# Patient Record
Sex: MALE | Race: ASIAN | NOT HISPANIC OR LATINO | URBAN - METROPOLITAN AREA
[De-identification: names, ages, dates, MRNs, and addresses within clinical notes are randomized per-mention and may not be internally consistent; named-entity substitution may affect disease eponyms.]

---

## 2018-05-14 ENCOUNTER — EMERGENCY (EMERGENCY)
Facility: HOSPITAL | Age: 53
LOS: 0 days | Discharge: HOME | End: 2018-05-14
Attending: EMERGENCY MEDICINE | Admitting: EMERGENCY MEDICINE

## 2018-05-14 VITALS
HEART RATE: 107 BPM | SYSTOLIC BLOOD PRESSURE: 149 MMHG | RESPIRATION RATE: 18 BRPM | DIASTOLIC BLOOD PRESSURE: 91 MMHG | OXYGEN SATURATION: 97 % | TEMPERATURE: 98 F

## 2018-05-14 DIAGNOSIS — E78.5 HYPERLIPIDEMIA, UNSPECIFIED: ICD-10-CM

## 2018-05-14 DIAGNOSIS — M79.1 MYALGIA: ICD-10-CM

## 2018-05-14 DIAGNOSIS — B02.9 ZOSTER WITHOUT COMPLICATIONS: ICD-10-CM

## 2018-05-14 DIAGNOSIS — R21 RASH AND OTHER NONSPECIFIC SKIN ERUPTION: ICD-10-CM

## 2018-05-14 DIAGNOSIS — I10 ESSENTIAL (PRIMARY) HYPERTENSION: ICD-10-CM

## 2018-05-14 DIAGNOSIS — R68.83 CHILLS (WITHOUT FEVER): ICD-10-CM

## 2018-05-14 DIAGNOSIS — I25.10 ATHEROSCLEROTIC HEART DISEASE OF NATIVE CORONARY ARTERY WITHOUT ANGINA PECTORIS: ICD-10-CM

## 2018-05-14 RX ORDER — ACYCLOVIR SODIUM 500 MG
800 VIAL (EA) INTRAVENOUS ONCE
Qty: 0 | Refills: 0 | Status: COMPLETED | OUTPATIENT
Start: 2018-05-14 | End: 2018-05-14

## 2018-05-14 RX ORDER — VALACYCLOVIR 500 MG/1
1 TABLET, FILM COATED ORAL
Qty: 21 | Refills: 0 | OUTPATIENT
Start: 2018-05-14 | End: 2018-05-20

## 2018-05-14 RX ADMIN — Medication 800 MILLIGRAM(S): at 23:48

## 2018-05-14 NOTE — ED PROVIDER NOTE - PHYSICAL EXAMINATION
Alert, NAD, WDWN, well-appearing  PERRL, EOMI, normal pupils, no icterus, normal external ENT, pink/moist membranes  Airway intact, Lungs CTAB, no wheezing or rhonchi, normal resp effort w/o tachypnea, no retractions, speaking full sentences  CVS1S2, RRR, no m/g/r, 2+ pulses b/l, warm/well-perfused  Abdomen soft, nondistended, no tenderness on palpation to all 4 quadrants  Skin warm/dry, erythematous tender vesicular rash to the right neck and shoulder, does not cross the midline

## 2018-05-14 NOTE — ED PROVIDER NOTE - NS ED ROS FT
SPECIFIC PATIENT INSTRUCTIONS FROM THE PHYSICIAN WHO TREATED YOU IN THE ER TODAY:  1. Return if any concerns or worsening of condition(s)  2.  Nasacort AQ and claritin for the next week and then stop. If sinus congestion recurs, then restart the nasacort AQ and claritin for a week at a time. 3.  FOLLOW UP APPOINTMENT:  Your primary doctor in 1 week. 4.  Use a Nedi Pot to help clear your sinuses. You may purchase the original Nedi Pot at a pharmacy or get a generic version at [facility] or [employer]. Sinusitis: Care Instructions  Your Care Instructions    Sinusitis is an infection of the lining of the sinus cavities in your head. Sinusitis often follows a cold. It causes pain and pressure in your head and face. In most cases, sinusitis gets better on its own in 1 to 2 weeks. But some mild symptoms may last for several weeks. Sometimes antibiotics are needed. Follow-up care is a key part of your treatment and safety. Be sure to make and go to all appointments, and call your doctor if you are having problems. It's also a good idea to know your test results and keep a list of the medicines you take. How can you care for yourself at home? · Take an over-the-counter pain medicine, such as acetaminophen (Tylenol), ibuprofen (Advil, Motrin), or naproxen (Aleve). Read and follow all instructions on the label. · If the doctor prescribed antibiotics, take them as directed. Do not stop taking them just because you feel better. You need to take the full course of antibiotics. · Be careful when taking over-the-counter cold or flu medicines and Tylenol at the same time. Many of these medicines have acetaminophen, which is Tylenol. Read the labels to make sure that you are not taking more than the recommended dose. Too much acetaminophen (Tylenol) can be harmful. · Breathe warm, moist air from a steamy shower, a hot bath, or a sink filled with hot water. Avoid cold, dry air.  Using a humidifier in your home may help. Follow the directions for cleaning the machine. · Use saline (saltwater) nasal washes to help keep your nasal passages open and wash out mucus and bacteria. You can buy saline nose drops at a grocery store or drugstore. Or you can make your own at home by adding 1 teaspoon of salt and 1 teaspoon of baking soda to 2 cups of distilled water. If you make your own, fill a bulb syringe with the solution, insert the tip into your nostril, and squeeze gently. [place] your nose. · Put a hot, wet towel or a warm gel pack on your face 3 or 4 times a day for 5 to 10 minutes each time. · Try a decongestant nasal spray like oxymetazoline (Afrin). Do not use it for more than 3 days in a row. Using it for more than 3 days can make your congestion worse. When should you call for help? Call your doctor now or seek immediate medical care if:  · You have new or worse swelling or redness in your face or around your eyes. · You have a new or higher fever. Watch closely for changes in your health, and be sure to contact your doctor if:  · You have new or worse facial pain. · The mucus from your nose becomes thicker (like pus) or has new blood in it. · You are not getting better as expected. Where can you learn more? Go to http://mar-david.info/. Enter O588 in the search box to learn more about \"Sinusitis: Care Instructions. \"  Current as of: July 29, 2016  Content Version: 11.2  © 3304-6952 [employer]. Care instructions adapted under license by [employer] (which disclaims liability or warranty for this information). If you have questions about a medical condition or this instruction, always ask your healthcare professional. [place] [address] any warranty or liability for your use of this information.          Saline Nasal Washes: Care Instructions  Your Care Instructions  Saline nasal washes help keep the nasal passages open by washing out thick or dried mucus. This simple remedy can help relieve symptoms of allergies, sinusitis, and colds. It also can make the nose feel more comfortable by keeping the mucous membranes moist. You may notice a little burning sensation in your nose the first few times you use the solution, but this usually gets better in a few days. Follow-up care is a key part of your treatment and safety. Be sure to make and go to all appointments, and call your doctor if you are having problems. It's also a good idea to know your test results and keep a list of the medicines you take. How can you care for yourself at home? · You can buy premixed saline solution in a squeeze bottle or other sinus rinse products at a drugstore. Read and follow the instructions on the label. · You also can make your own saline solution by adding 1 teaspoon of salt and 1 teaspoon of baking soda to 2 cups of distilled water. · If you use a homemade solution, pour a small amount into a clean bowl. Using a rubber bulb syringe, squeeze the syringe and place the tip in the salt water. Pull a small amount of the salt water into the syringe by relaxing your hand. · Sit down with your head tilted slightly back. Do not lie down. Put the tip of the bulb syringe or the squeeze bottle a little way into one of your nostrils. Gently drip or squirt a few drops into the nostril. Repeat with the other nostril. Some sneezing and gagging are normal at first.  · Gently blow your nose. · Wipe the syringe or bottle tip clean after each use. · Repeat this 2 or 3 times a day. · Use nasal washes gently if you have nosebleeds often. When should you call for help? Watch closely for changes in your health, and be sure to contact your doctor if:  · You often get nosebleeds. · You have problems doing the nasal washes. Where can you learn more? Go to http://mar-david.info/.   Enter [id number] in the search box to learn more about \"Saline Nasal Washes: Care Instructions. \"  Current as of: July 29, 2016  Content Version: 11.2  © 0889-2382 [employer]. Care instructions adapted under license by [employer] (which disclaims liability or warranty for this information). If you have questions about a medical condition or this instruction, always ask your healthcare professional. [facility] any warranty or liability for your use of this information. Chest Pain: Care Instructions  Your Care Instructions  There are many things that can cause chest pain. Some are not serious and will get better on their own in a few days. But some kinds of chest pain need more testing and treatment. Your doctor may have recommended a follow-up visit in the next 8 to 12 hours. If you are not getting better, you may need more tests or treatment. Even though your doctor has released you, you still need to watch for any problems. The doctor carefully checked you, but sometimes problems can develop later. If you have new symptoms or if your symptoms do not get better, get medical care right away. If you have worse or different chest pain or pressure that lasts more than 5 minutes or you passed out (lost consciousness), call 911 or seek other emergency help right away. A medical visit is only one step in your treatment. Even if you feel better, you still need to do what your doctor recommends, such as going to all suggested follow-up appointments and taking medicines exactly as directed. This will help you recover and help prevent future problems. How can you care for yourself at home? · Rest until you feel better. · Take your medicine exactly as prescribed. Call your doctor if you think you are having a problem with your medicine. · Do not drive after taking a prescription pain medicine. When should you call for help? Call 911 if:  · You passed out (lost consciousness). · You have severe difficulty breathing.   · You have symptoms of a heart attack. These may include:  ¨ Chest pain or pressure, or a strange feeling in your chest.  ¨ Sweating. ¨ Shortness of breath. ¨ Nausea or vomiting. ¨ Pain, pressure, or a strange feeling in your back, neck, jaw, or upper belly or in one or both shoulders or arms. ¨ Lightheadedness or sudden weakness. ¨ A fast or irregular heartbeat. After you call 911, the  may tell you to chew 1 adult-strength or 2 to 4 low-dose aspirin. Wait for an ambulance. Do not try to drive yourself. Call your doctor today if:  · You have any trouble breathing. · Your chest pain gets worse. · You are dizzy or lightheaded, or you feel like you may faint. · You are not getting better as expected. · You are having new or different chest pain. Where can you learn more? Go to http://mar-david.info/. Enter A120 in the search box to learn more about \"Chest Pain: Care Instructions. \"  Current as of: May 27, 2016  Content Version: 11.2  © 0875-8213 [employer]. Care instructions adapted under license by [employer] (which disclaims liability or warranty for this information). If you have questions about a medical condition or this instruction, always ask your healthcare professional. [address] any warranty or liability for your use of this information. [employer] Activation    Thank you for requesting access to [employer]. Please follow the instructions below to securely access and download your online medical record. [employer] allows you to send messages to your doctor, view your test results, renew your prescriptions, schedule appointments, and more. How Do I Sign Up? 1. In your internet browser, go to [email or web address]. 2. Click on the First Time User? Click Here link in the Sign In box. You will see the New Member Sign Up page. 3. Enter your [employer] Access Code exactly as it appears below.  You will not need to use this code after youve completed the sign-up process. If you do not sign up before the expiration date, you must request a new code. Ways[employer] Access Code: [email or web address]  Expires: 2017  8:45 PM (This is the date your [employer] access code will )    4. Enter the last four digits of your Social Security Number (xxxx) and Date of Birth (mm/dd/yyyy) as indicated and click Submit. You will be taken to the next sign-up page. 5. Create a Chooslyt ID. This will be your Ways[employer] login ID and cannot be changed, so think of one that is secure and easy to remember. 6. Create a Ways[employer] password. You can change your password at any time. 7. Enter your Password Reset Question and Answer. This can be used at a later time if you forget your password. 8. Enter your e-mail address. You will receive e-mail notification when new information is available in [address]. 9. Click Sign Up. You can now view and download portions of your medical record. 10. Click the Download Summary menu link to download a portable copy of your medical information. Additional Information    If you have questions, please visit the Frequently Asked Questions section of the WaysGo website at [email or web address] Remember, WaysGo is NOT to be used for urgent needs. For medical emergencies, dial 911. Review of Systems:  	•	CONSTITUTIONAL - no fever, no diaphoresis, no chills  	•	SKIN - +rash  	•	RESPIRATORY - no shortness of breath, no cough  	•	CARDIAC - no chest pain, no palpitations  	•	GI - no abd pain, no nausea, no vomiting, no diarrhea  	•	MUSCULOSKELETAL - no joint paint, no swelling, no redness  	•	NEUROLOGIC - no weakness, no headache, no paresthesias, no LOC

## 2018-05-14 NOTE — ED PROVIDER NOTE - ATTENDING CONTRIBUTION TO CARE
I personally evaluated the patient. I reviewed the Resident’s or Physician Assistant’s note (as assigned above), and agree with the findings and plan except as documented in my note.     52 male here for eval of new rash to right neck area. On Humira from his PMD for plaque psoriasis, dose #7 given today.     PMH: HLD, CAD, HTN  PSH: CABG x 2    ROS: + chills & myalgias. No other symptoms.     PE: male in no distress. SKIN: right neck area with vesicular rash no signs of cellulitis CHEST: CTA bilateral.CV: pulses intact    Impression: zoster    Plan: antivirals, supportive care, return & Follow up instructions

## 2018-05-14 NOTE — ED PROVIDER NOTE - MEDICAL DECISION MAKING DETAILS
52 male with new zoster on DMARD prescription for plaque psoriasis will give antivirals and continue outpatient management.

## 2018-05-14 NOTE — ED ADULT TRIAGE NOTE - CHIEF COMPLAINT QUOTE
pt, hx psoriasis, takes Humira injection and half hour later began having hives to right neck and chest x3 days. A&Ox3, no respiratory distress. Also c/o body aches and fever.

## 2018-05-14 NOTE — ED PROVIDER NOTE - OBJECTIVE STATEMENT
52 yr old male, PMH of HTN, HLD, CAD x 2 stents, and plaque psoriasis, presenting with rash to the right neck and shoulder, states he recently started Humira, has had 6 shots without problems, today took his 7th shot and then 30 mins later began to have this rash erupt, associated with some subjective chills and myalgias. Rash is painful, described as burning sensation. States he has had the shingles vaccine.

## 2022-07-11 VITALS
BODY MASS INDEX: 28.39 KG/M2 | HEART RATE: 70 BPM | HEIGHT: 67.72 IN | DIASTOLIC BLOOD PRESSURE: 90 MMHG | SYSTOLIC BLOOD PRESSURE: 150 MMHG | WEIGHT: 185.19 LBS

## 2022-10-25 DIAGNOSIS — Z82.49 FAMILY HISTORY OF ISCHEMIC HEART DISEASE AND OTHER DISEASES OF THE CIRCULATORY SYSTEM: ICD-10-CM

## 2022-11-01 RX ORDER — ASPIRIN 81 MG
81 TABLET, DELAYED RELEASE (ENTERIC COATED) ORAL
Refills: 0 | Status: DISCONTINUED | COMMUNITY
End: 2022-11-01

## 2022-11-15 ENCOUNTER — APPOINTMENT (OUTPATIENT)
Dept: CARDIOLOGY | Facility: CLINIC | Age: 57
End: 2022-11-15

## 2022-11-15 VITALS — BODY MASS INDEX: 26.66 KG/M2 | WEIGHT: 180 LBS | HEIGHT: 69 IN

## 2022-11-15 PROCEDURE — 99204 OFFICE O/P NEW MOD 45 MIN: CPT

## 2022-11-15 RX ORDER — CHLORTHALIDONE 25 MG/1
25 TABLET ORAL
Qty: 30 | Refills: 0 | Status: DISCONTINUED | COMMUNITY
Start: 2022-07-11 | End: 2022-11-15

## 2022-11-15 NOTE — DISCUSSION/SUMMARY
[FreeTextEntry1] : add zetia and start jardiance on patient \par LDL 84 target of 50 \par TG: diet control \par cut down salt \par exercise 5 times/week \par f/u in 4 months \par get bloodwork \par repeat bp 130/80 \par check at home if high call for adjusting meds.

## 2022-11-15 NOTE — HISTORY OF PRESENT ILLNESS
[FreeTextEntry1] : pt with HLD, DM, FAMILY H/ MI(BROTHER AT 55 years of age), Mid LAD ESHA  exsmoker  \par \par pt exercising 3 to 4 times/week on treadmill without cp or sob, pt says bp high 150 today but usually fine however ate a lot of salt with chinese the last few days. \par T poor diet \par LDL 84 \par A1c: 6.3 on diet control for DM. pt with no cp or sob. \par \par bp today 150/80 initally

## 2022-11-15 NOTE — PHYSICAL EXAM
[Normal S1, S2] : normal S1, S2 [Murmur] : murmur [Clear Lung Fields] : clear lung fields [de-identified] : JOSE  [de-identified] : RRR

## 2022-12-16 ENCOUNTER — NON-APPOINTMENT (OUTPATIENT)
Age: 57
End: 2022-12-16

## 2022-12-16 DIAGNOSIS — Z78.9 OTHER SPECIFIED HEALTH STATUS: ICD-10-CM

## 2022-12-16 DIAGNOSIS — Z87.891 PERSONAL HISTORY OF NICOTINE DEPENDENCE: ICD-10-CM

## 2023-05-16 NOTE — DISCUSSION/SUMMARY
[FreeTextEntry1] : add zetia and start jardiance on patient \par LDL 84 target of 50 \par TG: diet control \par cut down salt \par exercise 5 times/week \par f/u in 4 months \par get blood work \par repeat bp 130/80 \par check at home if high call for adjusting meds.  [facility] Pregnancy And Lactation Text: This medication is Pregnancy Category X and contraindicated in pregnancy and in women who may become pregnant. It is unknown if this medication is excreted in breast milk.

## 2023-05-16 NOTE — PHYSICAL EXAM
[Normal S1, S2] : normal S1, S2 [Murmur] : murmur [Clear Lung Fields] : clear lung fields [de-identified] : JOSE  [de-identified] : RRR

## 2023-05-16 NOTE — HISTORY OF PRESENT ILLNESS
[FreeTextEntry1] : pt with HLD, DM, FAMILY H/ MI(BROTHER AT 55 years of age), Mid LAD ESHA  exsmoker  \par \par pt exercising 3 to 4 times/week on treadmill without cp or sob, pt says bp high 150 today but usually fine however ate a lot of salt with chinese the last few days. \par T poor diet \par LDL 84 \par A1c: 6.3 on diet control for DM. pt with no cp or sob. \par \par bp today 150/80 initally \par \par 23:\par

## 2023-05-17 ENCOUNTER — APPOINTMENT (OUTPATIENT)
Dept: CARDIOLOGY | Facility: CLINIC | Age: 58
End: 2023-05-17

## 2023-07-05 RX ORDER — LOSARTAN POTASSIUM 100 MG/1
100 TABLET, FILM COATED ORAL
Refills: 0 | Status: DISCONTINUED | COMMUNITY
End: 2023-07-05

## 2023-07-05 RX ORDER — EMPAGLIFLOZIN 10 MG/1
10 TABLET, FILM COATED ORAL DAILY
Qty: 90 | Refills: 3 | Status: ACTIVE | COMMUNITY
Start: 2022-11-15 | End: 1900-01-01

## 2023-07-05 RX ORDER — EZETIMIBE 10 MG/1
10 TABLET ORAL
Qty: 90 | Refills: 3 | Status: ACTIVE | COMMUNITY
Start: 2022-11-15 | End: 1900-01-01

## 2023-07-05 RX ORDER — BENZONATATE 100 MG/1
100 CAPSULE ORAL
Qty: 30 | Refills: 0 | Status: DISCONTINUED | COMMUNITY
Start: 2022-07-13 | End: 2023-07-05

## 2023-11-14 RX ORDER — ASPIRIN 81 MG/1
81 TABLET, CHEWABLE ORAL DAILY
Qty: 90 | Refills: 3 | Status: ACTIVE | COMMUNITY
Start: 2022-03-10 | End: 1900-01-01

## 2023-11-14 RX ORDER — LOSARTAN POTASSIUM 100 MG/1
100 TABLET, FILM COATED ORAL DAILY
Qty: 90 | Refills: 3 | Status: ACTIVE | COMMUNITY
Start: 1900-01-01 | End: 1900-01-01

## 2023-11-14 RX ORDER — METOPROLOL TARTRATE 25 MG/1
25 TABLET, FILM COATED ORAL TWICE DAILY
Qty: 90 | Refills: 3 | Status: ACTIVE | COMMUNITY
Start: 1900-01-01 | End: 1900-01-01

## 2023-11-14 RX ORDER — ATORVASTATIN CALCIUM 80 MG/1
80 TABLET, FILM COATED ORAL
Qty: 90 | Refills: 3 | Status: ACTIVE | COMMUNITY
Start: 1900-01-01 | End: 1900-01-01

## 2023-12-11 ENCOUNTER — APPOINTMENT (OUTPATIENT)
Dept: CARDIOLOGY | Facility: CLINIC | Age: 58
End: 2023-12-11
Payer: MEDICAID

## 2023-12-11 RX ORDER — CLOPIDOGREL BISULFATE 75 MG/1
75 TABLET, FILM COATED ORAL
Qty: 90 | Refills: 3 | Status: DISCONTINUED | COMMUNITY
End: 2023-12-11

## 2023-12-18 ENCOUNTER — APPOINTMENT (OUTPATIENT)
Dept: CARDIOLOGY | Facility: CLINIC | Age: 58
End: 2023-12-18
Payer: MEDICAID

## 2023-12-18 VITALS — HEIGHT: 69 IN | WEIGHT: 176 LBS | BODY MASS INDEX: 26.07 KG/M2

## 2023-12-18 DIAGNOSIS — Z00.00 ENCOUNTER FOR GENERAL ADULT MEDICAL EXAMINATION W/OUT ABNORMAL FINDINGS: ICD-10-CM

## 2023-12-18 PROCEDURE — 99214 OFFICE O/P EST MOD 30 MIN: CPT

## 2024-01-10 NOTE — HISTORY OF PRESENT ILLNESS
[FreeTextEntry1] : pt with HLD, DM, HTN Mid LAD ESHA , FAMILY H/O MI(BROTHER AT 55 years of age),  exsmoker    pt exercising 3 to 4 times/week on treadmill without cp or sob, pt says bp high 150 today but usually fine however ate a lot of salt with chinese the last few days.  T poor diet  LDL 84  A1c: 6.3 on diet control for DM. pt with no cp or sob.   bp today 150/80 initally repeat 130/80   23:  : GFR: 100, no cholesterol on labs BS: 151  TG was 228 in past will need repeat.  pt here for f/u and tired as working at [employer] from 6:45 to 6:15 pm  and not exercising as tired and lifting high weights at times at [employer], pt having difficulty with this, pt also having difficulty going upstairs.  pt not cp or sob but more tired. pt did not get bloodwork, pt had problems with insurance and was in [place].   24: Echo denied by insurance.

## 2024-01-10 NOTE — PHYSICAL EXAM
[Normal S1, S2] : normal S1, S2 [Murmur] : murmur [Clear Lung Fields] : clear lung fields [de-identified] : JOSE  [de-identified] : RRR

## 2024-01-10 NOTE — DISCUSSION/SUMMARY
[FreeTextEntry1] : cont zetia cont jardiance on patient  LDL 84 target of 55 TG: diet control  cont aspirin  cont atorvstatin 80 mg po qhs  cont losartan 100  cont metoprolol 25 q12, stop plavix if on.  cut down salt  bp controlled today,  get bloodwork tired get 2 d echo  pt is not able to lift more than 20 lbs and should not climb ladders as on blood thinners and CAD.  f/u in 2 months

## 2024-01-16 ENCOUNTER — APPOINTMENT (OUTPATIENT)
Dept: CARDIOLOGY | Facility: CLINIC | Age: 59
End: 2024-01-16

## 2024-01-29 ENCOUNTER — APPOINTMENT (OUTPATIENT)
Dept: CARDIOLOGY | Facility: CLINIC | Age: 59
End: 2024-01-29
Payer: MEDICAID

## 2024-01-29 VITALS — HEART RATE: 50 BPM | SYSTOLIC BLOOD PRESSURE: 140 MMHG | DIASTOLIC BLOOD PRESSURE: 90 MMHG

## 2024-01-29 VITALS — BODY MASS INDEX: 25.92 KG/M2 | WEIGHT: 175 LBS | HEIGHT: 69 IN

## 2024-01-29 PROCEDURE — 99213 OFFICE O/P EST LOW 20 MIN: CPT

## 2024-01-29 NOTE — DISCUSSION/SUMMARY
[FreeTextEntry1] : cont zetia cont jardiance on patient  LDL 84 target of 55 TG: diet control  cont aspirin  cont atorvstatin 80 mg po qhs  cont losartan 100  cont metoprolol 25 q12, stop plavix if on.  cut down salt  get bloodwork  start nifedipine 30 mg

## 2024-01-29 NOTE — HISTORY OF PRESENT ILLNESS
[FreeTextEntry1] : pt with HLD, DM, HTN Mid LAD ESHA , FAMILY H/O MI(BROTHER AT 55 years of age),  exsmoker    pt exercising 3 to 4 times/week on treadmill without cp or sob, pt says bp high 150 today but usually fine however ate a lot of salt with chinese the last few days.  T poor diet  LDL 84  A1c: 6.3 on diet control for DM. pt with no cp or sob.   bp today 150/80 initally repeat 130/80   23:  : GFR: 100, no cholesterol on labs BS: 151  TG was 228 in past will need repeat.  pt here for f/u and tired as working at [employer] from 6:45 to 6:15 pm  and not exercising as tired and lifting high weights at times at [employer], pt having difficulty with this, pt also having difficulty going upstairs.  pt not cp or sob but more tired. pt did not get bloodwork, pt had problems with insurance and was in [place].   24:  Echo denied by insurance.  pt sob going up a flight of stairs. pt here for and hr is on higher side. pt denies complaints.  pt bp high a few times, pt is bradycardic but hr increases with running in place.

## 2024-01-29 NOTE — PHYSICAL EXAM
[Normal S1, S2] : normal S1, S2 [Murmur] : murmur [Clear Lung Fields] : clear lung fields [de-identified] : JOSE  [de-identified] : RRR

## 2024-02-20 ENCOUNTER — APPOINTMENT (OUTPATIENT)
Dept: CARDIOLOGY | Facility: CLINIC | Age: 59
End: 2024-02-20
Payer: MEDICAID

## 2024-02-20 VITALS — SYSTOLIC BLOOD PRESSURE: 130 MMHG | HEART RATE: 50 BPM | DIASTOLIC BLOOD PRESSURE: 80 MMHG

## 2024-02-20 VITALS — HEIGHT: 69 IN | WEIGHT: 170 LBS | BODY MASS INDEX: 25.18 KG/M2

## 2024-02-20 DIAGNOSIS — J44.9 CHRONIC OBSTRUCTIVE PULMONARY DISEASE, UNSPECIFIED: ICD-10-CM

## 2024-02-20 DIAGNOSIS — R73.03 PREDIABETES.: ICD-10-CM

## 2024-02-20 DIAGNOSIS — I11.9 HYPERTENSIVE HEART DISEASE W/OUT HEART FAILURE: ICD-10-CM

## 2024-02-20 DIAGNOSIS — Z98.61 CORONARY ANGIOPLASTY STATUS: ICD-10-CM

## 2024-02-20 DIAGNOSIS — R94.30 ABNORMAL RESULT OF CARDIOVASCULAR FUNCTION STUDY, UNSPECIFIED: ICD-10-CM

## 2024-02-20 DIAGNOSIS — I20.89 OTHER FORMS OF ANGINA PECTORIS: ICD-10-CM

## 2024-02-20 DIAGNOSIS — E78.2 MIXED HYPERLIPIDEMIA: ICD-10-CM

## 2024-02-20 PROCEDURE — 99214 OFFICE O/P EST MOD 30 MIN: CPT

## 2024-02-20 RX ORDER — NIFEDIPINE 30 MG/1
30 TABLET, EXTENDED RELEASE ORAL
Qty: 90 | Refills: 3 | Status: DISCONTINUED | COMMUNITY
Start: 2024-01-29 | End: 2024-02-20

## 2024-02-20 RX ORDER — EVOLOCUMAB 140 MG/ML
140 INJECTION, SOLUTION SUBCUTANEOUS
Qty: 6 | Refills: 3 | Status: ACTIVE | COMMUNITY
Start: 2024-02-20 | End: 1900-01-01

## 2024-02-20 NOTE — PHYSICAL EXAM
[Normal S1, S2] : normal S1, S2 [Murmur] : murmur [Clear Lung Fields] : clear lung fields [de-identified] : JOSE  [de-identified] : RRR

## 2024-02-20 NOTE — HISTORY OF PRESENT ILLNESS
[FreeTextEntry1] : pt with HLD, DM, HTN Mid LAD ESHA , FAMILY H/O MI(BROTHER AT 55 years of age),  exsmoker    pt exercising 3 to 4 times/week on treadmill without cp or sob, pt says bp high 150 today but usually fine however ate a lot of salt with chinese the last few days.  T poor diet  LDL 84  A1c: 6.3 on diet control for DM. pt with no cp or sob.   bp today 150/80 initally repeat 130/80   23:  : GFR: 100, no cholesterol on labs BS: 151  TG was 228 in past will need repeat.  pt here for f/u and tired as working at [employer] from 6:45 to 6:15 pm  and not exercising as tired and lifting high weights at times at [employer], pt having difficulty with this, pt also having difficulty going upstairs.  pt not cp or sob but more tired. pt did not get bloodwork, pt had problems with insurance and was in [place].   24:  Echo denied by insurance.  pt sob going up a flight of stairs. pt here for and hr is on lower side. pt denies complaints.  pt bp high a few times, pt is bradycardic but hr increases with running in place.   24:  : TG increased to 212 chronic on diet control, LDL 81 elevated  GFR:74 A1c: 6.6  pt did not get nifedipine but bp ok so will not use. pt sob only with fast exertion. chronic bradycardia, pt denies fatigue tiredness or syncope.

## 2024-02-20 NOTE — DISCUSSION/SUMMARY
[FreeTextEntry1] : cont zetia 10 mg po qhs  cont jardiance 10  LDL 84 target of 55 on atorvastatin 80 mg po qhs  TG: diet control  cont aspirin  will consider repatha if able to get  at next visit if TG still high start meds  cont losartan 100  cont metoprolol 25 q12, cut down salt  continue ezetimbe for cholesterol  start repatha get bloodwork f/u in 3 months

## 2024-08-05 ENCOUNTER — APPOINTMENT (OUTPATIENT)
Dept: CARDIOLOGY | Facility: CLINIC | Age: 59
End: 2024-08-05

## 2024-08-05 NOTE — DISCUSSION/SUMMARY
[FreeTextEntry1] : cont zetia 10 mg po qhs  cont jardiance 10  TG: diet control  cont aspirin 81 mg  LDL 84 target of 55 on atorvastatin 80 mg po qhs  at next visit if TG still high start meds  cont losartan 100  cont metoprolol 25 q12, cut down salt  continue ezetimbe 10 mg po qhs  start repatha get bloodwork 2 d echo f/u in 4 months

## 2024-08-05 NOTE — HISTORY OF PRESENT ILLNESS
[FreeTextEntry1] : pt with mLAD ESHA , HLD, DM, HTN FAMILY H/O MI(BROTHER AT 55 years of age),  exsmoker    pt exercising 3 to 4 times/week on treadmill without cp or sob, pt says bp high 150 today but usually fine however ate a lot of salt with chinese the last few days.  T poor diet  LDL 84  A1c: 6.3 on diet control for DM. pt with no cp or sob.   bp today 150/80 initally repeat 130/80   23:  : GFR: 100, no cholesterol on labs BS: 151  TG was 228 in past will need repeat.  pt here for f/u and tired as working at [employer] from 6:45 to 6:15 pm  and not exercising as tired and lifting high weights at times at [employer], pt having difficulty with this, pt also having difficulty going upstairs.  pt not cp or sob but more tired. pt did not get bloodwork, pt had problems with insurance and was in [place].   24:  Echo denied by insurance.  pt sob going up a flight of stairs. pt here for and hr is on lower side. pt denies complaints.  pt bp high a few times, pt is bradycardic but hr increases with running in place.   24:  : TG increased to 212 chronic on diet control, LDL 81 elevated  GFR:74 A1c: 6.6  pt did not get nifedipine but bp ok so will not use. pt sob only with fast exertion. chronic bradycardia, pt denies fatigue tiredness or syncope.   24: repatha ordered last visit.  labs:

## 2024-08-05 NOTE — PHYSICAL EXAM
[Normal S1, S2] : normal S1, S2 [Murmur] : murmur [Clear Lung Fields] : clear lung fields [de-identified] : JOSE  [de-identified] : RRR

## 2024-08-05 NOTE — PHYSICAL EXAM
[Normal S1, S2] : normal S1, S2 [Murmur] : murmur [Clear Lung Fields] : clear lung fields [de-identified] : JOSE  [de-identified] : RRR

## 2024-08-05 NOTE — HISTORY OF PRESENT ILLNESS
[FreeTextEntry1] : pt with mLAD ESHA , HLD, DM, HTN FAMILY H/O MI(BROTHER AT 55 years of age),  exsmoker    pt exercising 3 to 4 times/week on treadmill without cp or sob, pt says bp high 150 today but usually fine however ate a lot of salt with chinese the last few days.  T poor diet  LDL 84  A1c: 6.3 on diet control for DM. pt with no cp or sob.   bp today 150/80 initally repeat 130/80   23:  : GFR: 100, no cholesterol on labs BS: 151  TG was 228 in past will need repeat.  pt here for f/u and tired as working at [employer] from 6:45 to 6:15 pm  and not exercising as tired and lifting high weights at times at [employer], pt having difficulty with this, pt also having difficulty going upstairs.  pt not cp or sob but more tired. pt did not get bloodwork, pt had problems with insurance and was in [place].   24:  Echo denied by insurance.  pt sob going up a flight of stairs. pt here for and hr is on lower side. pt denies complaints.  pt bp high a few times, pt is bradycardic but hr increases with running in place.   24:  : TG increased to 212 chronic on diet control, LDL 81 elevated  GFR:74 A1c: 6.6  pt did not get nifedipine but bp ok so will not use. pt sob only with fast exertion. chronic bradycardia, pt denies fatigue tiredness or syncope.   24: repatha ordered last visit.  labs:

## 2024-08-05 NOTE — HISTORY OF PRESENT ILLNESS
[FreeTextEntry1] : pt with mLAD ESHA , HLD, DM, HTN FAMILY H/O MI(BROTHER AT 55 years of age),  exsmoker    pt exercising 3 to 4 times/week on treadmill without cp or sob, pt says bp high 150 today but usually fine however ate a lot of salt with chinese the last few days.  T poor diet  LDL 84  A1c: 6.3 on diet control for DM. pt with no cp or sob.   bp today 150/80 initally repeat 130/80   23:  : GFR: 100, no cholesterol on labs BS: 151  TG was 228 in past will need repeat.  pt here for f/u and tired as working at [employer] from 6:45 to 6:15 pm  and not exercising as tired and lifting high weights at times at [employer], pt having difficulty with this, pt also having difficulty going upstairs.  pt not cp or sob but more tired. pt did not get bloodwork, pt had problems with insurance and was in [place].   24:  Echo denied by insurance.  pt sob going up a flight of stairs. pt here for and hr is on lower side. pt denies complaints.  pt bp high a few times, pt is bradycardic but hr increases with running in place.   24:  : TG increased to 212 chronic on diet control, LDL 81 elevated  GFR:74 A1c: 6.6  pt did not get nifedipine but bp ok so will not use. pt sob only with fast exertion. chronic bradycardia, pt denies fatigue tiredness or syncope.   24: repatha ordered last visit.  labs:

## 2024-08-05 NOTE — PHYSICAL EXAM
[Normal S1, S2] : normal S1, S2 [Murmur] : murmur [Clear Lung Fields] : clear lung fields [de-identified] : JOSE  [de-identified] : RRR

## 2024-08-12 ENCOUNTER — NON-APPOINTMENT (OUTPATIENT)
Age: 59
End: 2024-08-12

## 2024-08-13 ENCOUNTER — APPOINTMENT (OUTPATIENT)
Dept: CARDIOLOGY | Facility: CLINIC | Age: 59
End: 2024-08-13
Payer: MEDICAID

## 2024-08-13 VITALS — DIASTOLIC BLOOD PRESSURE: 80 MMHG | HEART RATE: 50 BPM | SYSTOLIC BLOOD PRESSURE: 120 MMHG

## 2024-08-13 VITALS — HEIGHT: 69 IN | BODY MASS INDEX: 27.25 KG/M2 | WEIGHT: 184 LBS

## 2024-08-13 DIAGNOSIS — I20.89 OTHER FORMS OF ANGINA PECTORIS: ICD-10-CM

## 2024-08-13 DIAGNOSIS — R73.03 PREDIABETES.: ICD-10-CM

## 2024-08-13 DIAGNOSIS — E78.2 MIXED HYPERLIPIDEMIA: ICD-10-CM

## 2024-08-13 DIAGNOSIS — I11.9 HYPERTENSIVE HEART DISEASE W/OUT HEART FAILURE: ICD-10-CM

## 2024-08-13 DIAGNOSIS — R94.30 ABNORMAL RESULT OF CARDIOVASCULAR FUNCTION STUDY, UNSPECIFIED: ICD-10-CM

## 2024-08-13 DIAGNOSIS — Z98.61 CORONARY ANGIOPLASTY STATUS: ICD-10-CM

## 2024-08-13 DIAGNOSIS — J44.9 CHRONIC OBSTRUCTIVE PULMONARY DISEASE, UNSPECIFIED: ICD-10-CM

## 2024-08-13 PROCEDURE — G2211 COMPLEX E/M VISIT ADD ON: CPT | Mod: NC

## 2024-08-13 PROCEDURE — 99214 OFFICE O/P EST MOD 30 MIN: CPT

## 2024-08-13 NOTE — PHYSICAL EXAM
[Normal S1, S2] : normal S1, S2 [Murmur] : murmur [Clear Lung Fields] : clear lung fields [de-identified] : JOSE  [de-identified] : RRR

## 2024-08-13 NOTE — PHYSICAL EXAM
[Normal S1, S2] : normal S1, S2 [Murmur] : murmur [Clear Lung Fields] : clear lung fields [de-identified] : JOSE  [de-identified] : RRR

## 2024-08-13 NOTE — HISTORY OF PRESENT ILLNESS
[FreeTextEntry1] : pt with HLD, DM, HTN Mid LAD ESHA , FAMILY H/O MI(BROTHER AT 55 years of age),  exsmoker    pt exercising 3 to 4 times/week on treadmill without cp or sob, pt says bp high 150 today but usually fine however ate a lot of salt with chinese the last few days.  T poor diet  LDL 84  A1c: 6.3 on diet control for DM. pt with no cp or sob.   bp today 150/80 initally repeat 130/80   23:  : GFR: 100, no cholesterol on labs BS: 151  TG was 228 in past will need repeat.  pt here for f/u and tired as working at [employer] from 6:45 to 6:15 pm  and not exercising as tired and lifting high weights at times at [facility] [employer], pt having difficulty with this, pt also having difficulty going upstairs.  pt not cp or sob but more tired. pt did not get bloodwork, pt had problems with insurance and was in [place].   24:  Echo denied by insurance.  pt sob going up a flight of stairs. pt here for and hr is on lower side. pt denies complaints.  pt bp high a few times, pt is bradycardic but hr increases with running in place.   24:  : TG increased to 212 chronic on diet control, LDL 81 elevated  GFR:74 A1c: 6.6  pt did not get nifedipine but bp ok so will not use. pt sob only with fast exertion. chronic bradycardia, pt denies fatigue tiredness or syncope.   24:  repatha started,  labs:not done, pt having a lot of pain in his groin, pt had difficulty in job and going up ladder carrying a lot of weight at times and had difficulty. pt works at [employer].  pt was not able to do it. pt is unable to climb a ladder with any weight. pt came back from [place], pt does not have chest pain, pt says has a lot of heavy breathing and fatigue with carrying packages otherwise no complaints.

## 2024-08-13 NOTE — HISTORY OF PRESENT ILLNESS
[FreeTextEntry1] : pt with HLD, DM, HTN Mid LAD ESHA , FAMILY H/O MI(BROTHER AT 55 years of age),  exsmoker    pt exercising 3 to 4 times/week on treadmill without cp or sob, pt says bp high 150 today but usually fine however ate a lot of salt with chinese the last few days.  T poor diet  LDL 84  A1c: 6.3 on diet control for DM. pt with no cp or sob.   bp today 150/80 initally repeat 130/80   23:  : GFR: 100, no cholesterol on labs BS: 151  TG was 228 in past will need repeat.  pt here for f/u and tired as working at [employer] from 6:45 to 6:15 pm  and not exercising as tired and lifting high weights at times at [facility], pt having difficulty with this, pt also having difficulty going upstairs.  pt not cp or sob but more tired. pt did not get bloodwork, pt had problems with insurance and was in [place].   24:  Echo denied by insurance.  pt sob going up a flight of stairs. pt here for and hr is on lower side. pt denies complaints.  pt bp high a few times, pt is bradycardic but hr increases with running in place.   24:  : TG increased to 212 chronic on diet control, LDL 81 elevated  GFR:74 A1c: 6.6  pt did not get nifedipine but bp ok so will not use. pt sob only with fast exertion. chronic bradycardia, pt denies fatigue tiredness or syncope.   24:  repatha started,  labs:not done, pt having a lot of pain in his groin, pt had difficulty in job and going up ladder carrying a lot of weight at times and had difficulty. pt works at [employer].  pt was not able to do it. pt is unable to climb a ladder with any weight. pt came back from [place], pt does not have chest pain, pt says has a lot of heavy breathing and fatigue with carrying packages otherwise no complaints.

## 2024-08-13 NOTE — DISCUSSION/SUMMARY
[FreeTextEntry1] : cont zetia 10 mg po qhs  cont jardiance 10  LDL 84 target of 53 on atorvastatin 80 mg po qhs  TG: diet control  cont aspirin  at next visit if TG still high start meds  cont losartan 100  cont metoprolol 25 q12, cut down salt  continue ezetimibe for cholesterol  continue Repatha get bloodwork f/u in 3 months 2 d echo

## 2024-10-14 ENCOUNTER — APPOINTMENT (OUTPATIENT)
Dept: CARDIOLOGY | Facility: CLINIC | Age: 59
End: 2024-10-14
Payer: MEDICAID

## 2024-10-14 PROCEDURE — 93306 TTE W/DOPPLER COMPLETE: CPT

## 2024-11-01 ENCOUNTER — RX RENEWAL (OUTPATIENT)
Age: 59
End: 2024-11-01

## 2024-11-15 ENCOUNTER — APPOINTMENT (OUTPATIENT)
Dept: CARDIOLOGY | Facility: CLINIC | Age: 59
End: 2024-11-15
Payer: COMMERCIAL

## 2024-11-15 VITALS
SYSTOLIC BLOOD PRESSURE: 126 MMHG | WEIGHT: 184 LBS | HEIGHT: 69 IN | DIASTOLIC BLOOD PRESSURE: 76 MMHG | HEART RATE: 71 BPM | BODY MASS INDEX: 27.25 KG/M2

## 2024-11-15 DIAGNOSIS — Z98.61 CORONARY ANGIOPLASTY STATUS: ICD-10-CM

## 2024-11-15 DIAGNOSIS — I20.89 OTHER FORMS OF ANGINA PECTORIS: ICD-10-CM

## 2024-11-15 DIAGNOSIS — J44.9 CHRONIC OBSTRUCTIVE PULMONARY DISEASE, UNSPECIFIED: ICD-10-CM

## 2024-11-15 DIAGNOSIS — E78.2 MIXED HYPERLIPIDEMIA: ICD-10-CM

## 2024-11-15 DIAGNOSIS — I11.9 HYPERTENSIVE HEART DISEASE W/OUT HEART FAILURE: ICD-10-CM

## 2024-11-15 DIAGNOSIS — R73.03 PREDIABETES.: ICD-10-CM

## 2024-11-15 PROCEDURE — G2211 COMPLEX E/M VISIT ADD ON: CPT | Mod: NC

## 2024-11-15 PROCEDURE — 99214 OFFICE O/P EST MOD 30 MIN: CPT

## 2024-11-15 RX ORDER — FENOFIBRATE 145 MG/1
145 TABLET, COATED ORAL DAILY
Qty: 90 | Refills: 3 | Status: ACTIVE | COMMUNITY
Start: 2024-11-15 | End: 1900-01-01

## 2025-02-07 ENCOUNTER — APPOINTMENT (OUTPATIENT)
Dept: CARDIOLOGY | Facility: CLINIC | Age: 60
End: 2025-02-07
Payer: COMMERCIAL

## 2025-02-07 PROCEDURE — 93880 EXTRACRANIAL BILAT STUDY: CPT

## 2025-02-19 ENCOUNTER — NON-APPOINTMENT (OUTPATIENT)
Age: 60
End: 2025-02-19

## 2025-03-17 ENCOUNTER — APPOINTMENT (OUTPATIENT)
Dept: CARDIOLOGY | Facility: CLINIC | Age: 60
End: 2025-03-17
Payer: COMMERCIAL

## 2025-03-17 ENCOUNTER — NON-APPOINTMENT (OUTPATIENT)
Age: 60
End: 2025-03-17

## 2025-03-17 VITALS
DIASTOLIC BLOOD PRESSURE: 76 MMHG | SYSTOLIC BLOOD PRESSURE: 121 MMHG | OXYGEN SATURATION: 97 % | HEIGHT: 68 IN | HEART RATE: 84 BPM | WEIGHT: 183 LBS | BODY MASS INDEX: 27.74 KG/M2

## 2025-03-17 DIAGNOSIS — Z98.61 CORONARY ANGIOPLASTY STATUS: ICD-10-CM

## 2025-03-17 DIAGNOSIS — E78.2 MIXED HYPERLIPIDEMIA: ICD-10-CM

## 2025-03-17 DIAGNOSIS — I11.9 HYPERTENSIVE HEART DISEASE W/OUT HEART FAILURE: ICD-10-CM

## 2025-03-17 DIAGNOSIS — R73.03 PREDIABETES.: ICD-10-CM

## 2025-03-17 PROCEDURE — 99204 OFFICE O/P NEW MOD 45 MIN: CPT

## 2025-03-17 PROCEDURE — 93000 ELECTROCARDIOGRAM COMPLETE: CPT

## 2025-03-17 PROCEDURE — G2211 COMPLEX E/M VISIT ADD ON: CPT | Mod: NC

## 2025-07-21 ENCOUNTER — OUTPATIENT (OUTPATIENT)
Dept: OUTPATIENT SERVICES | Facility: HOSPITAL | Age: 60
LOS: 1 days | End: 2025-07-21
Payer: COMMERCIAL

## 2025-07-21 DIAGNOSIS — Z00.8 ENCOUNTER FOR OTHER GENERAL EXAMINATION: ICD-10-CM

## 2025-07-21 PROCEDURE — 76981 USE PARENCHYMA: CPT

## 2025-07-21 PROCEDURE — 76705 ECHO EXAM OF ABDOMEN: CPT

## 2025-07-21 PROCEDURE — 76705 ECHO EXAM OF ABDOMEN: CPT | Mod: 26

## 2025-07-21 PROCEDURE — 76981 USE PARENCHYMA: CPT | Mod: 26

## 2025-08-11 ENCOUNTER — APPOINTMENT (OUTPATIENT)
Dept: CARDIOLOGY | Facility: CLINIC | Age: 60
End: 2025-08-11
Payer: COMMERCIAL

## 2025-08-11 VITALS
BODY MASS INDEX: 26.52 KG/M2 | DIASTOLIC BLOOD PRESSURE: 82 MMHG | HEART RATE: 101 BPM | HEIGHT: 68 IN | SYSTOLIC BLOOD PRESSURE: 150 MMHG | WEIGHT: 175 LBS

## 2025-08-11 DIAGNOSIS — I20.89 OTHER FORMS OF ANGINA PECTORIS: ICD-10-CM

## 2025-08-11 DIAGNOSIS — R73.03 PREDIABETES.: ICD-10-CM

## 2025-08-11 DIAGNOSIS — R94.30 ABNORMAL RESULT OF CARDIOVASCULAR FUNCTION STUDY, UNSPECIFIED: ICD-10-CM

## 2025-08-11 DIAGNOSIS — I11.9 HYPERTENSIVE HEART DISEASE W/OUT HEART FAILURE: ICD-10-CM

## 2025-08-11 DIAGNOSIS — E78.2 MIXED HYPERLIPIDEMIA: ICD-10-CM

## 2025-08-11 DIAGNOSIS — Z98.61 CORONARY ANGIOPLASTY STATUS: ICD-10-CM

## 2025-08-11 PROCEDURE — 93000 ELECTROCARDIOGRAM COMPLETE: CPT

## 2025-08-11 PROCEDURE — 99213 OFFICE O/P EST LOW 20 MIN: CPT | Mod: 25

## 2025-09-11 ENCOUNTER — RX RENEWAL (OUTPATIENT)
Age: 60
End: 2025-09-11